# Patient Record
Sex: MALE | Race: WHITE | Employment: FULL TIME | URBAN - METROPOLITAN AREA
[De-identification: names, ages, dates, MRNs, and addresses within clinical notes are randomized per-mention and may not be internally consistent; named-entity substitution may affect disease eponyms.]

---

## 2021-12-21 ENCOUNTER — APPOINTMENT (OUTPATIENT)
Dept: GENERAL RADIOLOGY | Age: 51
End: 2021-12-21
Payer: COMMERCIAL

## 2021-12-21 ENCOUNTER — HOSPITAL ENCOUNTER (EMERGENCY)
Age: 51
Discharge: HOME | End: 2021-12-21
Attending: STUDENT IN AN ORGANIZED HEALTH CARE EDUCATION/TRAINING PROGRAM
Payer: COMMERCIAL

## 2021-12-21 VITALS
DIASTOLIC BLOOD PRESSURE: 78 MMHG | HEIGHT: 72 IN | BODY MASS INDEX: 27.77 KG/M2 | SYSTOLIC BLOOD PRESSURE: 115 MMHG | RESPIRATION RATE: 15 BRPM | HEART RATE: 81 BPM | WEIGHT: 205 LBS | TEMPERATURE: 99.2 F | OXYGEN SATURATION: 95 %

## 2021-12-21 DIAGNOSIS — J18.9 PNEUMONIA OF RIGHT UPPER LOBE DUE TO INFECTIOUS ORGANISM: Primary | ICD-10-CM

## 2021-12-21 LAB
A/G RATIO: 1.3 (ref 1.1–2.2)
ALBUMIN SERPL-MCNC: 4.2 G/DL (ref 3.4–5)
ALP BLD-CCNC: 59 U/L (ref 40–129)
ALT SERPL-CCNC: 33 U/L (ref 10–40)
ANION GAP SERPL CALCULATED.3IONS-SCNC: 11 MMOL/L (ref 3–16)
AST SERPL-CCNC: 36 U/L (ref 15–37)
BASOPHILS ABSOLUTE: 0 K/UL (ref 0–0.2)
BASOPHILS RELATIVE PERCENT: 0.1 %
BILIRUB SERPL-MCNC: 0.6 MG/DL (ref 0–1)
BUN BLDV-MCNC: 13 MG/DL (ref 7–20)
CALCIUM SERPL-MCNC: 8.7 MG/DL (ref 8.3–10.6)
CHLORIDE BLD-SCNC: 96 MMOL/L (ref 99–110)
CO2: 25 MMOL/L (ref 21–32)
CREAT SERPL-MCNC: 0.7 MG/DL (ref 0.9–1.3)
EKG ATRIAL RATE: 83 BPM
EKG DIAGNOSIS: NORMAL
EKG P AXIS: 63 DEGREES
EKG P-R INTERVAL: 136 MS
EKG Q-T INTERVAL: 386 MS
EKG QRS DURATION: 92 MS
EKG QTC CALCULATION (BAZETT): 453 MS
EKG R AXIS: 60 DEGREES
EKG T AXIS: 52 DEGREES
EKG VENTRICULAR RATE: 83 BPM
EOSINOPHILS ABSOLUTE: 0 K/UL (ref 0–0.6)
EOSINOPHILS RELATIVE PERCENT: 0.1 %
GFR AFRICAN AMERICAN: >60
GFR NON-AFRICAN AMERICAN: >60
GLUCOSE BLD-MCNC: 113 MG/DL (ref 70–99)
HCT VFR BLD CALC: 41 % (ref 40.5–52.5)
HEMOGLOBIN: 14.3 G/DL (ref 13.5–17.5)
LYMPHOCYTES ABSOLUTE: 1 K/UL (ref 1–5.1)
LYMPHOCYTES RELATIVE PERCENT: 12.2 %
MAGNESIUM: 1.8 MG/DL (ref 1.8–2.4)
MCH RBC QN AUTO: 29.2 PG (ref 26–34)
MCHC RBC AUTO-ENTMCNC: 34.8 G/DL (ref 31–36)
MCV RBC AUTO: 83.9 FL (ref 80–100)
MONOCYTES ABSOLUTE: 0.9 K/UL (ref 0–1.3)
MONOCYTES RELATIVE PERCENT: 11.8 %
NEUTROPHILS ABSOLUTE: 6.1 K/UL (ref 1.7–7.7)
NEUTROPHILS RELATIVE PERCENT: 75.8 %
PDW BLD-RTO: 13 % (ref 12.4–15.4)
PLATELET # BLD: 215 K/UL (ref 135–450)
PMV BLD AUTO: 7 FL (ref 5–10.5)
POTASSIUM REFLEX MAGNESIUM: 3.4 MMOL/L (ref 3.5–5.1)
PRO-BNP: 10 PG/ML (ref 0–124)
RAPID INFLUENZA  B AGN: NEGATIVE
RAPID INFLUENZA A AGN: NEGATIVE
RBC # BLD: 4.88 M/UL (ref 4.2–5.9)
SODIUM BLD-SCNC: 132 MMOL/L (ref 136–145)
TOTAL PROTEIN: 7.5 G/DL (ref 6.4–8.2)
TROPONIN: <0.01 NG/ML
TROPONIN: <0.01 NG/ML
WBC # BLD: 8 K/UL (ref 4–11)

## 2021-12-21 PROCEDURE — U0003 INFECTIOUS AGENT DETECTION BY NUCLEIC ACID (DNA OR RNA); SEVERE ACUTE RESPIRATORY SYNDROME CORONAVIRUS 2 (SARS-COV-2) (CORONAVIRUS DISEASE [COVID-19]), AMPLIFIED PROBE TECHNIQUE, MAKING USE OF HIGH THROUGHPUT TECHNOLOGIES AS DESCRIBED BY CMS-2020-01-R: HCPCS

## 2021-12-21 PROCEDURE — 83880 ASSAY OF NATRIURETIC PEPTIDE: CPT

## 2021-12-21 PROCEDURE — 71046 X-RAY EXAM CHEST 2 VIEWS: CPT

## 2021-12-21 PROCEDURE — 87804 INFLUENZA ASSAY W/OPTIC: CPT

## 2021-12-21 PROCEDURE — 83735 ASSAY OF MAGNESIUM: CPT

## 2021-12-21 PROCEDURE — 93005 ELECTROCARDIOGRAM TRACING: CPT | Performed by: STUDENT IN AN ORGANIZED HEALTH CARE EDUCATION/TRAINING PROGRAM

## 2021-12-21 PROCEDURE — 85025 COMPLETE CBC W/AUTO DIFF WBC: CPT

## 2021-12-21 PROCEDURE — 36415 COLL VENOUS BLD VENIPUNCTURE: CPT

## 2021-12-21 PROCEDURE — 99284 EMERGENCY DEPT VISIT MOD MDM: CPT

## 2021-12-21 PROCEDURE — U0005 INFEC AGEN DETEC AMPLI PROBE: HCPCS

## 2021-12-21 PROCEDURE — 84484 ASSAY OF TROPONIN QUANT: CPT

## 2021-12-21 PROCEDURE — 80053 COMPREHEN METABOLIC PANEL: CPT

## 2021-12-21 PROCEDURE — 6370000000 HC RX 637 (ALT 250 FOR IP): Performed by: STUDENT IN AN ORGANIZED HEALTH CARE EDUCATION/TRAINING PROGRAM

## 2021-12-21 RX ORDER — ROSUVASTATIN CALCIUM 5 MG/1
5 TABLET, COATED ORAL DAILY
COMMUNITY
Start: 2021-08-11

## 2021-12-21 RX ORDER — LISINOPRIL AND HYDROCHLOROTHIAZIDE 20; 12.5 MG/1; MG/1
TABLET ORAL
COMMUNITY
Start: 2021-10-24

## 2021-12-21 RX ORDER — AZITHROMYCIN 250 MG/1
250 TABLET, FILM COATED ORAL DAILY
Qty: 4 TABLET | Refills: 0 | Status: SHIPPED | OUTPATIENT
Start: 2021-12-21 | End: 2021-12-25

## 2021-12-21 RX ORDER — POTASSIUM CHLORIDE 20 MEQ/1
40 TABLET, EXTENDED RELEASE ORAL ONCE
Status: COMPLETED | OUTPATIENT
Start: 2021-12-21 | End: 2021-12-21

## 2021-12-21 RX ORDER — M-VIT,TX,IRON,MINS/CALC/FOLIC 27MG-0.4MG
1 TABLET ORAL DAILY
COMMUNITY

## 2021-12-21 RX ORDER — CEFDINIR 300 MG/1
300 CAPSULE ORAL 2 TIMES DAILY
Qty: 14 CAPSULE | Refills: 0 | Status: SHIPPED | OUTPATIENT
Start: 2021-12-21 | End: 2021-12-28

## 2021-12-21 RX ORDER — AZITHROMYCIN 250 MG/1
500 TABLET, FILM COATED ORAL ONCE
Status: COMPLETED | OUTPATIENT
Start: 2021-12-21 | End: 2021-12-21

## 2021-12-21 RX ORDER — CEFDINIR 300 MG/1
300 CAPSULE ORAL ONCE
Status: COMPLETED | OUTPATIENT
Start: 2021-12-21 | End: 2021-12-21

## 2021-12-21 RX ADMIN — POTASSIUM CHLORIDE 40 MEQ: 1500 TABLET, EXTENDED RELEASE ORAL at 16:47

## 2021-12-21 RX ADMIN — AZITHROMYCIN MONOHYDRATE 500 MG: 250 TABLET ORAL at 16:50

## 2021-12-21 RX ADMIN — CEFDINIR 300 MG: 300 CAPSULE ORAL at 17:46

## 2021-12-21 NOTE — ED NOTES
Pt discharged from ED in stable condition. Discharge instruction explain, all question answered. Prescription given. Pt walked to [facility] independently.         Uche Tidwell RN  12/21/21 2192

## 2021-12-21 NOTE — ED NOTES
Patient ambulate in room, and oxygen saturation remained at 97% on room air. Patient had no c/o of dizziness or sob.       Vaibhav Jarvis RN  12/21/21 [id number]

## 2021-12-21 NOTE — ED TRIAGE NOTES
Pt arrived to ED with shortness of breath. COVID like symptoms for 6 days. Complaining of cough, fever for 6 days.

## 2021-12-21 NOTE — ED PROVIDER NOTES
Take 5 mg by mouth daily       Allergies     He has No Known Allergies. Physical Exam     INITIAL VITALS: BP: 117/74, Temp: 99.2 °F (37.3 °C), Pulse: 83, Resp: 13, SpO2: 96 %   Physical Exam    Const: well-appearing male in NAD  Head: NCAT  Eyes: PERRLA, EOMI  Neck: supple, no LAD, no thyromegaly   Cardiac: RRR, normal S1/S2, no m/r/g  Pulm: CTAB   Abdo: soft, nontender, nondistended, no masses or organomegaly   Skin: no rash or lesion  Ext: no peripheral edema, peripheral pulses intact   Neuro: AxOx4, no focal neuro deficit, cranial nerve exam normal, strength 5/5     DiagnosticResults     EKG   Interpreted in conjunction with emergencydepartment physician Va Guo MD  Rhythm: normal sinus   Rate: normal  Axis: normal  Ectopy: none  Conduction: normal  ST Segments: no acute change  T Waves:no acute change  Q Waves: none  Clinical Impression: no acute changes  Comparison: None    RADIOLOGY:  XR CHEST (2 VW)   Final Result      Mild right upper lobe airspace disease suspicious for developing pneumonia. Radiographic follow-up to clearing.           LABS:   Results for orders placed or performed during the hospital encounter of 12/21/21   Rapid influenza A/B antigens    Specimen: Nasopharyngeal   Result Value Ref Range    Rapid Influenza A Ag Negative Negative    Rapid Influenza B Ag Negative Negative   CBC Auto Differential   Result Value Ref Range    WBC 8.0 4.0 - 11.0 K/uL    RBC 4.88 4.20 - 5.90 M/uL    Hemoglobin 14.3 13.5 - 17.5 g/dL    Hematocrit 41.0 40.5 - 52.5 %    MCV 83.9 80.0 - 100.0 fL    MCH 29.2 26.0 - 34.0 pg    MCHC 34.8 31.0 - 36.0 g/dL    RDW 13.0 12.4 - 15.4 %    Platelets 231 704 - 381 K/uL    MPV 7.0 5.0 - 10.5 fL    Neutrophils % 75.8 %    Lymphocytes % 12.2 %    Monocytes % 11.8 %    Eosinophils % 0.1 %    Basophils % 0.1 %    Neutrophils Absolute 6.1 1.7 - 7.7 K/uL    Lymphocytes Absolute 1.0 1.0 - 5.1 K/uL    Monocytes Absolute 0.9 0.0 - 1.3 K/uL    Eosinophils Absolute 0.0 0.0 - 0.6 K/uL    Basophils Absolute 0.0 0.0 - 0.2 K/uL   Troponin   Result Value Ref Range    Troponin <0.01 <0.01 ng/mL   Brain Natriuretic Peptide   Result Value Ref Range    Pro-BNP 10 0 - 124 pg/mL   Comprehensive Metabolic Panel w/ Reflex to MG   Result Value Ref Range    Sodium 132 (L) 136 - 145 mmol/L    Potassium reflex Magnesium 3.4 (L) 3.5 - 5.1 mmol/L    Chloride 96 (L) 99 - 110 mmol/L    CO2 25 21 - 32 mmol/L    Anion Gap 11 3 - 16    Glucose 113 (H) 70 - 99 mg/dL    BUN 13 7 - 20 mg/dL    CREATININE 0.7 (L) 0.9 - 1.3 mg/dL    GFR Non-African American >60 >60    GFR African American >60 >60    Calcium 8.7 8.3 - 10.6 mg/dL    Total Protein 7.5 6.4 - 8.2 g/dL    Albumin 4.2 3.4 - 5.0 g/dL    Albumin/Globulin Ratio 1.3 1.1 - 2.2    Total Bilirubin 0.6 0.0 - 1.0 mg/dL    Alkaline Phosphatase 59 40 - 129 U/L    ALT 33 10 - 40 U/L    AST 36 15 - 37 U/L   Magnesium   Result Value Ref Range    Magnesium 1.80 1.80 - 2.40 mg/dL       ED BEDSIDE ULTRASOUND:  None    RECENT VITALS:  BP: 117/74, Temp: 99.2 °F (37.3 °C), Pulse: 83,Resp: 13, SpO2: 96 %     Procedures     None    ED Course     Nursing Notes, Past Medical Hx, Past Surgical Hx, Social Hx, Allergies, and Family Hx were reviewed. The patient was given the followingmedications:  Orders Placed This Encounter   Medications    potassium chloride (KLOR-CON M) extended release tablet 40 mEq    azithromycin (ZITHROMAX) tablet 500 mg     Order Specific Question:   Antimicrobial Indications     Answer:   Pneumonia (CAP)    cefdinir (OMNICEF) capsule 300 mg     Order Specific Question:   Antimicrobial Indications     Answer:   Pneumonia (CAP)       CONSULTS:  None    MEDICAL DECISION MAKING / ASSESSMENT / Jonn Larkin is a 46 y.o. male PMH of smoking and nonmalignant pulmonary nodule who presents to the ED with SOB.  Patient complains of flu symptoms for the last 6 days and then today had an episode of sudden-onset shortness of breath and lightheadedness while driving that promptly resolved within a couple of minutes. On physical exam the patient's vital signs are all within normal limits and he is saturating well on room air. He is in no acute distress and no respiratory distress. On auscultation his lung fields are clear bilaterally. This patient's presentation is most consistent with panic attack. ACS also on differential but less likely given the time course and symptoms. Troponin negative and EKG also has no ischemic changes. CXR with mild RUL airspace disease concerning for PNA. Patient will get swabbed for covid and influenza. OK to discharge home with azithromycin for 5 days and cefdinir for 7 days for treatment of PNA. Should follow up with PCP and/or MyChart for results of testing. This patient was also evaluated by the attending physician. All care plans werediscussed and agreed upon. Clinical Impression     1. Panic attack    2. Pneumonia of right upper lobe due to infectious organism        Disposition     PATIENT REFERRED TO:  No follow-up provider specified.     DISCHARGE MEDICATIONS:  New Prescriptions    No medications on file       Grabiel Bridges MD  Resident  12/21/21 2402 Leigha Patel MD  Resident  12/21/21 3911

## 2021-12-21 NOTE — ED PROVIDER NOTES
ED Attending Attestation Note     Date of evaluation: 12/21/2021    This patient was seen by the resident. I have seen and examined the patient, agree with the workup, evaluation, management and diagnosis. The care plan has been discussed. I have reviewed the ECG and concur with the resident's interpretation. Briefly, no ST elevation or depression meeting criteria, NSR at 83    In brief my assessment reveals:  History of Present Illness     Jennifer Casillas is a 46 y.o. male who presents with shortness of breath    Reports 6 days of low-grade fever, cough, mild shortness of breath. Today while driving he had some worsening of the shortness of breath to become severe became concerned about it. He reports \"I started feeling like I was having a panic attack\" and then pulled over and the symptoms resolved after a few minutes. He strictly denies any chest pain either before during or after this event today, and also denies chest pain in the last 6 days. He has been around Covid positive contacts, although he thinks they were outside the quarantine by the time he saw them. He says the cough is moderate in severity, has been present for 6 days, and is not significantly productive of any mucus. He is a former smoker, having quit in about 2007 or 8. He does not carry a diagnosis of COPD. PMHx: Hypertension, hyperlipidemia, and as below. SH: Former smoker, no illicits, and as below. ROS:   Positive  as per HPI.   Negative for:    -Constitutional: Weight gain, weight loss    -Eyes:   Eye pain, eye discharge    -Ears/Nose/Throat: Ear pain, ear discharge    -Cardiovascular: CP, cyanosis    -Respiratory:  cough, SOB    -Gastrointestinal: Abd pain, nausea, vomiting, melena, hematochezia    -Genitourinary: hematuria, dysuria, urinary frequency    -Neurological: numbness or weakness    -Skin:   Rash, pruritis,     -Hematologic: easy bleeding, easy bruising    -Musculoskeletal:  joint swelling, joint redness      Past Medical, Surgical, Family, and Social History     He has a past medical history of Hyperlipidemia and Hypertension. He has no past surgical history on file. His family history is not on file. He reports that he has quit smoking. He has never used smokeless tobacco. He reports current alcohol use. He reports that he does not use drugs. Medications     Discharge Medication List as of 12/21/2021  5:48 PM      CONTINUE these medications which have NOT CHANGED    Details   rosuvastatin (CRESTOR) 5 MG tablet Take 5 mg by mouth dailyHistorical Med      lisinopril-hydroCHLOROthiazide (PRINZIDE;ZESTORETIC) 20-12.5 MG per tablet Historical Med      Multiple Vitamins-Minerals (THERAPEUTIC MULTIVITAMIN-MINERALS) tablet Take 1 tablet by mouth dailyHistorical Med             Allergies     He has No Known Allergies. Physical Exam     INITIAL VITALS: BP: 117/74, Temp: 99.2 °F (37.3 °C), Pulse: 83, Resp: 13, SpO2: 96 %     General:  Well appearing. No acute distress. Non-toxic appearing    Eyes:  Pupils equally round, reactive, brisk. No discharge from eyes. ENT:  No discharge from nose. OP clear. Neck:  Supple. Nontender. Pulmonary:   Non-labored breathing. Breath sounds clear bilaterally. Cardiac:  Regular rhythm, normal rate. No murmurs. Abdomen:  Soft. Non-tender. Non-distended. No masses. Musculoskeletal:  No long bone deformity. No ankle or wrist deformity. Vascular:  Extremities warm and perfused. Radial pulses 2+ bilaterally. Skin:  No rash. Warm. Neuro: Alert and oriented x 3. CN II-XII grossly intact. Speech and mentation normal.  VFFC  FNF intact b/l. No pronator drift. LE at least anti-gravity for hip flexion x5 secs b/l.  5/5 strength in all 4 extremities by finger  and ankle dorsi/plantar flexion. Sensation grossly intact to light touch. Gait narrow and stable, not ataxic. Extremities:  No peripheral edema. LE symmetric.     ED Course     Nursing Notes, Past Medical Hx, Past Surgical Hx, Social Hx, Allergies, and Family Hx were reviewed. The patient was given the following medications:  Orders Placed This Encounter   Medications    potassium chloride (KLOR-CON M) extended release tablet 40 mEq    azithromycin (ZITHROMAX) tablet 500 mg     Order Specific Question:   Antimicrobial Indications     Answer:   Pneumonia (CAP)    cefdinir (OMNICEF) capsule 300 mg     Order Specific Question:   Antimicrobial Indications     Answer:   Pneumonia (CAP)    azithromycin (ZITHROMAX) 250 MG tablet     Sig: Take 1 tablet by mouth daily for 4 days     Dispense:  4 tablet     Refill:  0    cefdinir (OMNICEF) 300 MG capsule     Sig: Take 1 capsule by mouth 2 times daily for 7 days     Dispense:  14 capsule     Refill:  0       CONSULTS:  None    MEDICAL DECISIONMAKING / ASSESSMENT / Daisha Dilip is a 46 y.o. male with shortness of breath    Pt was hemodynamically stable and afebrile in the Emergency Department. The absence of chest pain I did have a lower suspicion for ACS, PE, or aortic dissection. However, I did proceed to obtain 2 - troponins which further makes ACS less likely. I did not feel that further evaluation was required the patient's heart score was suitable for outpatient management even if he had had chest pain. Laboratory studies were overall unremarkable, and there was no anemia to explain patient symptoms. His electrolyte panel was reassuring there is no evidence for significant derangement or acute kidney injury. Influenza testing is negative. Chest x-ray does show possible infiltrate. Given he is symptomatic we will treat with cefdinir and azithromycin. .  Patient tolerated his initial doses here and will be given prescriptions. Ambulatory O2 sat was unremarkable. Clinical Impression     1. Pneumonia of right upper lobe due to infectious organism        Plan    Discharge home outpatient management return precautions.     Jeremy Mirza [address], [place]  12/21/21 7250

## 2021-12-21 NOTE — ED NOTES
Bed: B25-25  Expected date:   Expected time:   Means of arrival:   Comments:  Squad. Covid.  DAILY Hollis RN  12/21/21 9491

## 2021-12-22 LAB — SARS-COV-2: DETECTED
